# Patient Record
Sex: MALE | Race: WHITE | NOT HISPANIC OR LATINO
[De-identification: names, ages, dates, MRNs, and addresses within clinical notes are randomized per-mention and may not be internally consistent; named-entity substitution may affect disease eponyms.]

---

## 2017-03-20 ENCOUNTER — APPOINTMENT (OUTPATIENT)
Dept: DERMATOLOGY | Facility: CLINIC | Age: 67
End: 2017-03-20

## 2017-04-25 ENCOUNTER — APPOINTMENT (OUTPATIENT)
Dept: DERMATOLOGY | Facility: CLINIC | Age: 67
End: 2017-04-25

## 2017-10-26 ENCOUNTER — APPOINTMENT (OUTPATIENT)
Dept: DERMATOLOGY | Facility: CLINIC | Age: 67
End: 2017-10-26
Payer: MEDICARE

## 2017-10-26 PROCEDURE — 99213 OFFICE O/P EST LOW 20 MIN: CPT

## 2018-05-03 ENCOUNTER — RESULT REVIEW (OUTPATIENT)
Age: 68
End: 2018-05-03

## 2018-05-03 ENCOUNTER — APPOINTMENT (OUTPATIENT)
Dept: DERMATOLOGY | Facility: CLINIC | Age: 68
End: 2018-05-03
Payer: MEDICARE

## 2018-05-03 PROCEDURE — 99214 OFFICE O/P EST MOD 30 MIN: CPT | Mod: 25

## 2018-05-03 PROCEDURE — 11100 BX SKIN SUBCUTANEOUS&/MUCOUS MEMBRANE 1 LESION: CPT

## 2019-05-07 ENCOUNTER — APPOINTMENT (OUTPATIENT)
Dept: DERMATOLOGY | Facility: CLINIC | Age: 69
End: 2019-05-07
Payer: MEDICARE

## 2019-05-07 PROCEDURE — 99213 OFFICE O/P EST LOW 20 MIN: CPT

## 2019-11-11 ENCOUNTER — APPOINTMENT (OUTPATIENT)
Dept: DERMATOLOGY | Facility: CLINIC | Age: 69
End: 2019-11-11
Payer: MEDICARE

## 2019-11-11 PROCEDURE — 99214 OFFICE O/P EST MOD 30 MIN: CPT

## 2020-06-09 ENCOUNTER — APPOINTMENT (OUTPATIENT)
Dept: DERMATOLOGY | Facility: CLINIC | Age: 70
End: 2020-06-09
Payer: MEDICARE

## 2020-06-09 PROCEDURE — 99214 OFFICE O/P EST MOD 30 MIN: CPT

## 2020-12-15 ENCOUNTER — APPOINTMENT (OUTPATIENT)
Dept: DERMATOLOGY | Facility: CLINIC | Age: 70
End: 2020-12-15
Payer: MEDICARE

## 2020-12-15 PROCEDURE — 17000 DESTRUCT PREMALG LESION: CPT

## 2020-12-15 PROCEDURE — 99213 OFFICE O/P EST LOW 20 MIN: CPT | Mod: 25

## 2021-11-12 ENCOUNTER — NON-APPOINTMENT (OUTPATIENT)
Age: 71
End: 2021-11-12

## 2022-02-14 ENCOUNTER — APPOINTMENT (OUTPATIENT)
Dept: DERMATOLOGY | Facility: CLINIC | Age: 72
End: 2022-02-14
Payer: MEDICARE

## 2022-02-14 PROCEDURE — 99213 OFFICE O/P EST LOW 20 MIN: CPT

## 2022-04-15 ENCOUNTER — APPOINTMENT (OUTPATIENT)
Dept: DERMATOLOGY | Facility: CLINIC | Age: 72
End: 2022-04-15
Payer: MEDICARE

## 2022-04-15 PROCEDURE — 99213 OFFICE O/P EST LOW 20 MIN: CPT

## 2022-11-07 ENCOUNTER — NON-APPOINTMENT (OUTPATIENT)
Age: 72
End: 2022-11-07

## 2022-11-14 ENCOUNTER — APPOINTMENT (OUTPATIENT)
Dept: GASTROENTEROLOGY | Facility: CLINIC | Age: 72
End: 2022-11-14

## 2022-11-14 ENCOUNTER — NON-APPOINTMENT (OUTPATIENT)
Age: 72
End: 2022-11-14

## 2022-11-14 VITALS
BODY MASS INDEX: 26.66 KG/M2 | SYSTOLIC BLOOD PRESSURE: 130 MMHG | HEIGHT: 69 IN | HEART RATE: 70 BPM | OXYGEN SATURATION: 98 % | DIASTOLIC BLOOD PRESSURE: 70 MMHG | RESPIRATION RATE: 6 BRPM | WEIGHT: 180 LBS

## 2022-11-14 PROCEDURE — 99204 OFFICE O/P NEW MOD 45 MIN: CPT

## 2022-11-14 RX ORDER — POLYETHYLENE GLYCOL 3350 AND ELECTROLYTES WITH LEMON FLAVOR 236; 22.74; 6.74; 5.86; 2.97 G/4L; G/4L; G/4L; G/4L; G/4L
236 POWDER, FOR SOLUTION ORAL
Qty: 2 | Refills: 0 | Status: ACTIVE | COMMUNITY
Start: 2022-11-14 | End: 1900-01-01

## 2022-11-14 NOTE — REVIEW OF SYSTEMS
[Bloating (gassiness)] : bloating [Negative] : Musculoskeletal [Abdominal Pain] : no abdominal pain [Vomiting] : no vomiting [Constipation] : no constipation [Heartburn] : no heartburn [Melena (black stool)] : no melena [Bleeding] : no bleeding

## 2022-11-14 NOTE — ASSESSMENT
[FreeTextEntry1] : 72 year old male with a history of colon polyps and family history of colon cancer in a first degree relative presents for high risk screening. \par \par High Risk Screening\par Colonoscopy \par Bowel prep sent to pharmacy \par Procedure reviewed with patient  \par \par Dyspepsia/Gas without alarm symptoms\par Continue omeprazole prn \par Avoid dietary triggers

## 2022-11-14 NOTE — HISTORY OF PRESENT ILLNESS
[FreeTextEntry1] : 72 year old male with a history of HTN presents for colon cancer screening. He has a history of colon polyps. He has a family history of colon cancer in his sister. He reports gas and intermittent diarrhea. He states that his symptoms improved after switching to lactaid and adding lactase tabs before meals. He denies nausea, vomiting, reflux, abdominal pain or rectal bleeding. \par \par He uses omeprazole 20mg PRN for gas/dyspepsia stable for many years previously advised to remain on standing PPI, but does not wish to do so. He has never had an EGD  \par His last colon ~4-5 years prio r + polyps benign per patient

## 2022-11-14 NOTE — PHYSICAL EXAM
[Alert] : alert [Normal Voice/Communication] : normal voice/communication [Healthy Appearing] : healthy appearing [No Acute Distress] : no acute distress [Sclera] : the sclera and conjunctiva were normal [Hearing Threshold Finger Rub Not [place]] : hearing was normal [Normal Lips/Gums] : the lips and gums were normal [Oropharynx] : the oropharynx was normal [Normal Appearance] : the appearance of the neck was normal [No Neck Mass] : no neck mass was observed [No Respiratory Distress] : no respiratory distress [No Acc Muscle Use] : no accessory muscle use [Respiration, Rhythm And Depth] : normal respiratory rhythm and effort [Auscultation Breath Sounds / Voice Sounds] : lungs were clear to auscultation bilaterally [Heart Rate And Rhythm] : heart rate was normal and rhythm regular [None] : no edema [Bowel Sounds] : normal bowel sounds [No Masses] : no abdominal mass palpated [Abdomen Tenderness] : non-tender [Abdomen Soft] : soft [Cervical Lymph Nodes Enlarged Posterior Bilaterally] : no posterior cervical lymphadenopathy [Supraclavicular Lymph Nodes Enlarged Bilaterally] : no supraclavicular lymphadenopathy [Cervical Lymph Nodes Enlarged Anterior Bilaterally] : no anterior cervical lymphadenopathy [No CVA Tenderness] : no CVA  tenderness [No Spinal Tenderness] : no spinal tenderness [Normal Color / Pigmentation] : normal skin color and pigmentation [No Focal Deficits] : no focal deficits [Oriented To Time, Place, And Person] : oriented to person, place, and time

## 2022-11-27 ENCOUNTER — TRANSCRIPTION ENCOUNTER (OUTPATIENT)
Age: 72
End: 2022-11-27

## 2022-11-27 LAB — SARS-COV-2 N GENE NPH QL NAA+PROBE: NOT DETECTED

## 2022-11-28 ENCOUNTER — RESULT REVIEW (OUTPATIENT)
Age: 72
End: 2022-11-28

## 2022-11-28 ENCOUNTER — OUTPATIENT (OUTPATIENT)
Dept: OUTPATIENT SERVICES | Facility: HOSPITAL | Age: 72
LOS: 1 days | Discharge: HOME | End: 2022-11-28
Payer: MEDICARE

## 2022-11-28 ENCOUNTER — APPOINTMENT (OUTPATIENT)
Dept: GASTROENTEROLOGY | Facility: GI CENTER | Age: 72
End: 2022-11-28

## 2022-11-28 DIAGNOSIS — K63.5 POLYP OF COLON: ICD-10-CM

## 2022-11-28 DIAGNOSIS — Z12.11 ENCOUNTER FOR SCREENING FOR MALIGNANT NEOPLASM OF COLON: ICD-10-CM

## 2022-11-28 PROCEDURE — 45380 COLONOSCOPY AND BIOPSY: CPT | Mod: PT

## 2022-11-28 PROCEDURE — 88305 TISSUE EXAM BY PATHOLOGIST: CPT | Mod: 26

## 2022-11-28 PROCEDURE — 88305 TISSUE EXAM BY PATHOLOGIST: CPT

## 2022-11-28 NOTE — PHYSICAL EXAM

## 2022-12-02 LAB — SURGICAL PATHOLOGY STUDY: SIGNIFICANT CHANGE UP

## 2023-02-13 ENCOUNTER — OFFICE (OUTPATIENT)
Dept: URBAN - METROPOLITAN AREA CLINIC 12 | Facility: CLINIC | Age: 73
Setting detail: OPHTHALMOLOGY
End: 2023-02-13
Payer: MEDICARE

## 2023-02-13 DIAGNOSIS — H43.813: ICD-10-CM

## 2023-02-13 DIAGNOSIS — H43.393: ICD-10-CM

## 2023-02-13 DIAGNOSIS — H16.223: ICD-10-CM

## 2023-02-13 DIAGNOSIS — H25.13: ICD-10-CM

## 2023-02-13 DIAGNOSIS — H53.2: ICD-10-CM

## 2023-02-13 DIAGNOSIS — H40.013: ICD-10-CM

## 2023-02-13 PROCEDURE — 92250 FUNDUS PHOTOGRAPHY W/I&R: CPT | Performed by: OPHTHALMOLOGY

## 2023-02-13 PROCEDURE — 92014 COMPRE OPH EXAM EST PT 1/>: CPT | Performed by: OPHTHALMOLOGY

## 2023-02-13 ASSESSMENT — SUPERFICIAL PUNCTATE KERATITIS (SPK)
OD_SPK: T
OS_SPK: T

## 2023-02-13 ASSESSMENT — KERATOMETRY
METHOD_AUTO_MANUAL: AUTO
OD_K1POWER_DIOPTERS: 41.75
OS_K2POWER_DIOPTERS: 42.25
OS_K1POWER_DIOPTERS: 41.50
OD_K2POWER_DIOPTERS: 42.25
OS_AXISANGLE_DEGREES: 79
OD_AXISANGLE_DEGREES: 81

## 2023-02-13 ASSESSMENT — TONOMETRY
OD_IOP_MMHG: 16
OS_IOP_MMHG: 21

## 2023-02-13 ASSESSMENT — REFRACTION_CURRENTRX
OS_VPRISM_DIRECTION: SV
OS_AXIS: 131
OS_CYLINDER: -0.50
OD_VPRISM_DIRECTION: SV
OD_VPRISM_DIRECTION: SV
OS_OVR_VA: 20/
OD_SPHERE: +1.50
OD_OVR_VA: 20/
OS_VPRISM_DIRECTION: SV
OS_SPHERE: -1.00
OD_CYLINDER: -1.25
OS_OVR_VA: 20/
OS_SPHERE: +1.50
OD_SPHERE: -1.50
OD_OVR_VA: 20/
OD_AXIS: 131

## 2023-02-13 ASSESSMENT — CONFRONTATIONAL VISUAL FIELD TEST (CVF)
OS_FINDINGS: FULL
OD_FINDINGS: FULL

## 2023-02-13 ASSESSMENT — REFRACTION_MANIFEST
OD_CYLINDER: -0.75
OU_VA: 20/25
OS_CYLINDER: -0.50
OS_HPRISM: 1
OD_ADD: +2.50
OD_VPRISM_DIRECTION: BO
OS_SPHERE: -1.25
OD_AXIS: 151
OD_HPRISM: 1
OS_AXIS: 156
OS_VPRISM_DIRECTION: BO
OD_SPHERE: -1.00
OS_ADD: +2.50

## 2023-02-13 ASSESSMENT — AXIALLENGTH_DERIVED
OS_AL: 24.8355
OD_AL: 24.731
OS_AL: 24.8355
OD_AL: 24.731

## 2023-02-13 ASSESSMENT — SPHEQUIV_DERIVED
OS_SPHEQUIV: -1.5
OD_SPHEQUIV: -1.375
OS_SPHEQUIV: -1.5
OD_SPHEQUIV: -1.375

## 2023-02-13 ASSESSMENT — REFRACTION_AUTOREFRACTION
OD_SPHERE: -1.00
OS_AXIS: 156
OS_CYLINDER: -0.50
OD_AXIS: 151
OD_CYLINDER: -0.75
OS_SPHERE: -1.25

## 2023-02-13 ASSESSMENT — PACHYMETRY
OD_CT_UM: 499
OS_CT_UM: 496
OD_CT_CORRECTION: 4
OS_CT_CORRECTION: 4

## 2023-02-13 ASSESSMENT — VISUAL ACUITY
OS_BCVA: 20/20-1
OD_BCVA: 20/20

## 2023-03-24 PROBLEM — H40.1131 POAG; BOTH EYES MILD: Status: ACTIVE | Noted: 2023-03-24

## 2023-03-27 PROBLEM — H11.153 PINGUECULA; BOTH EYES: Status: ACTIVE | Noted: 2023-03-27

## 2023-04-13 ENCOUNTER — APPOINTMENT (OUTPATIENT)
Dept: DERMATOLOGY | Facility: CLINIC | Age: 73
End: 2023-04-13
Payer: MEDICARE

## 2023-04-13 PROCEDURE — 99213 OFFICE O/P EST LOW 20 MIN: CPT | Mod: 25

## 2023-04-13 PROCEDURE — 17000 DESTRUCT PREMALG LESION: CPT

## 2023-10-17 ENCOUNTER — APPOINTMENT (OUTPATIENT)
Dept: DERMATOLOGY | Facility: CLINIC | Age: 73
End: 2023-10-17
Payer: MEDICARE

## 2023-10-17 PROCEDURE — 99213 OFFICE O/P EST LOW 20 MIN: CPT | Mod: 25

## 2023-10-17 PROCEDURE — 17003 DESTRUCT PREMALG LES 2-14: CPT

## 2023-10-17 PROCEDURE — 17000 DESTRUCT PREMALG LESION: CPT

## 2023-10-30 ENCOUNTER — OFFICE (OUTPATIENT)
Dept: URBAN - METROPOLITAN AREA CLINIC 12 | Facility: CLINIC | Age: 73
Setting detail: OPHTHALMOLOGY
End: 2023-10-30
Payer: MEDICARE

## 2023-10-30 DIAGNOSIS — H25.13: ICD-10-CM

## 2023-10-30 DIAGNOSIS — H43.393: ICD-10-CM

## 2023-10-30 DIAGNOSIS — H40.013: ICD-10-CM

## 2023-10-30 DIAGNOSIS — H16.223: ICD-10-CM

## 2023-10-30 DIAGNOSIS — H53.2: ICD-10-CM

## 2023-10-30 DIAGNOSIS — H43.813: ICD-10-CM

## 2023-10-30 PROCEDURE — 92133 CPTRZD OPH DX IMG PST SGM ON: CPT | Performed by: OPHTHALMOLOGY

## 2023-10-30 PROCEDURE — 92014 COMPRE OPH EXAM EST PT 1/>: CPT | Performed by: OPHTHALMOLOGY

## 2023-10-30 PROCEDURE — 92083 EXTENDED VISUAL FIELD XM: CPT | Performed by: OPHTHALMOLOGY

## 2023-10-30 ASSESSMENT — REFRACTION_AUTOREFRACTION
OS_CYLINDER: -0.50
OD_SPHERE: -1.25
OD_CYLINDER: -0.50
OD_AXIS: 136
OS_AXIS: 134
OS_SPHERE: -1.25

## 2023-10-30 ASSESSMENT — KERATOMETRY
OS_AXISANGLE_DEGREES: 072
METHOD_AUTO_MANUAL: AUTO
OD_AXISANGLE_DEGREES: 079
OS_K2POWER_DIOPTERS: 42.25
OD_K1POWER_DIOPTERS: 42.00
OD_K2POWER_DIOPTERS: 42.25
OS_K1POWER_DIOPTERS: 41.75

## 2023-10-30 ASSESSMENT — REFRACTION_MANIFEST
OS_CYLINDER: -0.50
OS_VPRISM_DIRECTION: BO
OS_SPHERE: -1.25
OS_HPRISM: 1
OU_VA: 20/25
OD_HPRISM: 1
OS_AXIS: 134
OD_ADD: +2.50
OD_VPRISM_DIRECTION: BO
OS_VA1: 20/20
OS_ADD: +2.50
OD_CYLINDER: -0.50
OD_SPHERE: -1.25
OD_AXIS: 136

## 2023-10-30 ASSESSMENT — REFRACTION_CURRENTRX
OS_AXIS: 100
OS_VPRISM_DIRECTION: SV
OS_VPRISM_DIRECTION: SV
OS_OVR_VA: 20/
OD_SPHERE: +1.50
OD_VPRISM_DIRECTION: SV
OS_SPHERE: -1.00
OS_CYLINDER: -0.50
OD_AXIS: 119
OD_CYLINDER: -1.50
OD_OVR_VA: 20/
OS_OVR_VA: 20/
OD_OVR_VA: 20/
OD_VPRISM_DIRECTION: SV
OS_SPHERE: +1.50
OD_SPHERE: -1.75

## 2023-10-30 ASSESSMENT — CONFRONTATIONAL VISUAL FIELD TEST (CVF)
OS_FINDINGS: FULL
OD_FINDINGS: FULL

## 2023-10-30 ASSESSMENT — PACHYMETRY
OD_CT_UM: 499
OS_CT_CORRECTION: 4
OD_CT_CORRECTION: 4
OS_CT_UM: 496

## 2023-10-30 ASSESSMENT — AXIALLENGTH_DERIVED
OS_AL: 24.7847
OS_AL: 24.7847
OD_AL: 24.7341
OD_AL: 24.7341

## 2023-10-30 ASSESSMENT — SPHEQUIV_DERIVED
OD_SPHEQUIV: -1.5
OD_SPHEQUIV: -1.5
OS_SPHEQUIV: -1.5
OS_SPHEQUIV: -1.5

## 2023-10-30 ASSESSMENT — SUPERFICIAL PUNCTATE KERATITIS (SPK)
OD_SPK: T
OS_SPK: T

## 2023-10-30 ASSESSMENT — TONOMETRY
OS_IOP_MMHG: 16
OD_IOP_MMHG: 13

## 2023-10-30 ASSESSMENT — VISUAL ACUITY
OS_BCVA: 20/20
OD_BCVA: 20/20

## 2024-02-27 ENCOUNTER — OFFICE (OUTPATIENT)
Dept: URBAN - METROPOLITAN AREA CLINIC 12 | Facility: CLINIC | Age: 74
Setting detail: OPHTHALMOLOGY
End: 2024-02-27
Payer: MEDICARE

## 2024-02-27 DIAGNOSIS — H02.531: ICD-10-CM

## 2024-02-27 DIAGNOSIS — H16.223: ICD-10-CM

## 2024-02-27 DIAGNOSIS — H02.534: ICD-10-CM

## 2024-02-27 PROCEDURE — 99213 OFFICE O/P EST LOW 20 MIN: CPT | Performed by: STUDENT IN AN ORGANIZED HEALTH CARE EDUCATION/TRAINING PROGRAM

## 2024-02-27 ASSESSMENT — REFRACTION_AUTOREFRACTION
OS_SPHERE: -1.00
OS_AXIS: 150
OD_CYLINDER: -1.00
OS_CYLINDER: -0.50
OD_SPHERE: -1.00
OD_AXIS: 148

## 2024-02-27 ASSESSMENT — REFRACTION_CURRENTRX
OD_OVR_VA: 20/
OS_CYLINDER: -0.50
OS_VPRISM_DIRECTION: SV
OS_SPHERE: +1.50
OS_OVR_VA: 20/
OS_OVR_VA: 20/
OD_OVR_VA: 20/
OD_VPRISM_DIRECTION: SV
OS_AXIS: 100
OD_VPRISM_DIRECTION: SV
OD_CYLINDER: -1.50
OD_SPHERE: -1.75
OD_SPHERE: +1.50
OS_VPRISM_DIRECTION: SV
OD_AXIS: 119
OS_SPHERE: -1.00

## 2024-02-27 ASSESSMENT — SPHEQUIV_DERIVED
OD_SPHEQUIV: -1.5
OD_SPHEQUIV: -1.5
OS_SPHEQUIV: -1.25
OS_SPHEQUIV: -1.5

## 2024-02-27 ASSESSMENT — REFRACTION_MANIFEST
OD_ADD: +2.50
OS_SPHERE: -1.25
OS_ADD: +2.50
OS_CYLINDER: -0.50
OD_SPHERE: -1.25
OU_VA: 20/25
OD_VPRISM_DIRECTION: BO
OD_CYLINDER: -0.50
OD_AXIS: 136
OD_HPRISM: 1
OS_VA1: 20/20
OS_VPRISM_DIRECTION: BO
OS_AXIS: 134
OS_HPRISM: 1

## 2024-02-27 ASSESSMENT — CONFRONTATIONAL VISUAL FIELD TEST (CVF)
OS_FINDINGS: FULL
OD_FINDINGS: FULL

## 2024-02-27 ASSESSMENT — SUPERFICIAL PUNCTATE KERATITIS (SPK)
OS_SPK: T
OD_SPK: T

## 2024-03-15 ENCOUNTER — OFFICE (OUTPATIENT)
Dept: URBAN - METROPOLITAN AREA CLINIC 12 | Facility: CLINIC | Age: 74
Setting detail: OPHTHALMOLOGY
End: 2024-03-15
Payer: MEDICARE

## 2024-03-15 DIAGNOSIS — H25.13: ICD-10-CM

## 2024-03-15 DIAGNOSIS — H25.89: ICD-10-CM

## 2024-03-15 DIAGNOSIS — H02.531: ICD-10-CM

## 2024-03-15 DIAGNOSIS — H02.534: ICD-10-CM

## 2024-03-15 DIAGNOSIS — H16.223: ICD-10-CM

## 2024-03-15 PROCEDURE — 99213 OFFICE O/P EST LOW 20 MIN: CPT | Performed by: OPTOMETRIST

## 2024-03-15 ASSESSMENT — REFRACTION_CURRENTRX
OD_AXIS: 119
OS_VPRISM_DIRECTION: SV
OD_VPRISM_DIRECTION: SV
OS_OVR_VA: 20/
OS_CYLINDER: -0.50
OS_OVR_VA: 20/
OS_SPHERE: -1.00
OD_CYLINDER: -1.50
OS_SPHERE: +1.50
OD_SPHERE: -1.75
OD_SPHERE: +1.50
OS_VPRISM_DIRECTION: SV
OD_OVR_VA: 20/
OD_OVR_VA: 20/
OS_AXIS: 100
OD_VPRISM_DIRECTION: SV

## 2024-03-15 ASSESSMENT — REFRACTION_MANIFEST
OD_ADD: +2.50
OU_VA: 20/25
OD_CYLINDER: -0.50
OD_VPRISM_DIRECTION: BO
OS_VA1: 20/20
OS_ADD: +2.50
OD_AXIS: 136
OS_HPRISM: 1
OS_VPRISM_DIRECTION: BO
OD_HPRISM: 1
OS_AXIS: 134
OS_SPHERE: -1.25
OD_SPHERE: -1.25
OS_CYLINDER: -0.50

## 2024-03-15 ASSESSMENT — SPHEQUIV_DERIVED
OS_SPHEQUIV: -1.5
OD_SPHEQUIV: -1.5

## 2024-05-23 PROBLEM — H11.042 PTERYGIUM-PERIPHERAL ; LEFT EYE: Status: ACTIVE | Noted: 2024-05-23

## 2024-08-07 ENCOUNTER — NON-APPOINTMENT (OUTPATIENT)
Age: 74
End: 2024-08-07

## 2024-08-12 ENCOUNTER — APPOINTMENT (OUTPATIENT)
Dept: GASTROENTEROLOGY | Facility: CLINIC | Age: 74
End: 2024-08-12

## 2024-10-17 ENCOUNTER — APPOINTMENT (OUTPATIENT)
Dept: DERMATOLOGY | Facility: CLINIC | Age: 74
End: 2024-10-17
Payer: MEDICARE

## 2024-10-17 PROCEDURE — 99213 OFFICE O/P EST LOW 20 MIN: CPT

## 2024-10-21 ENCOUNTER — RX ONLY (RX ONLY)
Age: 74
End: 2024-10-21

## 2024-10-21 ENCOUNTER — OFFICE (OUTPATIENT)
Dept: URBAN - METROPOLITAN AREA CLINIC 12 | Facility: CLINIC | Age: 74
Setting detail: OPHTHALMOLOGY
End: 2024-10-21
Payer: MEDICARE

## 2024-10-21 DIAGNOSIS — H43.393: ICD-10-CM

## 2024-10-21 DIAGNOSIS — H16.223: ICD-10-CM

## 2024-10-21 DIAGNOSIS — H02.531: ICD-10-CM

## 2024-10-21 DIAGNOSIS — H53.2: ICD-10-CM

## 2024-10-21 DIAGNOSIS — H25.89: ICD-10-CM

## 2024-10-21 DIAGNOSIS — H40.013: ICD-10-CM

## 2024-10-21 DIAGNOSIS — H02.534: ICD-10-CM

## 2024-10-21 DIAGNOSIS — H25.13: ICD-10-CM

## 2024-10-21 DIAGNOSIS — H43.813: ICD-10-CM

## 2024-10-21 PROCEDURE — 92014 COMPRE OPH EXAM EST PT 1/>: CPT | Performed by: OPHTHALMOLOGY

## 2024-10-21 PROCEDURE — 92250 FUNDUS PHOTOGRAPHY W/I&R: CPT | Performed by: OPHTHALMOLOGY

## 2024-10-21 ASSESSMENT — REFRACTION_MANIFEST
OD_CYLINDER: -0.50
OD_SPHERE: -1.25
OU_VA: 20/25
OS_ADD: +2.50
OD_SPHERE: -0.75
OS_HPRISM: 1
OD_HPRISM: 1
OS_CYLINDER: -0.25
OD_ADD: +2.50
OD_AXIS: 155
OS_VA1: 20/20
OD_CYLINDER: -0.50
OS_CYLINDER: -0.50
OS_VA1: 20/20-2
OD_AXIS: 136
OS_AXIS: 145
OS_SPHERE: -1.00
OD_VPRISM_DIRECTION: BO
OS_AXIS: 134
OD_VA1: 20/30-2
OS_VPRISM_DIRECTION: BO
OS_SPHERE: -1.25

## 2024-10-21 ASSESSMENT — REFRACTION_CURRENTRX
OS_SPHERE: +1.50
OS_VPRISM_DIRECTION: SV
OD_OVR_VA: 20/
OD_OVR_VA: 20/
OD_VPRISM_DIRECTION: SV
OD_SPHERE: +1.50
OD_VPRISM_DIRECTION: SV
OS_SPHERE: -1.00
OD_AXIS: 119
OS_OVR_VA: 20/
OS_CYLINDER: -0.50
OS_OVR_VA: 20/
OD_SPHERE: -1.75
OD_CYLINDER: -1.50
OS_AXIS: 100
OS_VPRISM_DIRECTION: SV

## 2024-10-21 ASSESSMENT — TONOMETRY
OD_IOP_MMHG: 12
OS_IOP_MMHG: 16

## 2024-10-21 ASSESSMENT — KERATOMETRY
OS_K2POWER_DIOPTERS: 42.25
OD_K2POWER_DIOPTERS: 42.25
OS_K1POWER_DIOPTERS: 41.75
OD_K1POWER_DIOPTERS: 41.75
OD_AXISANGLE_DEGREES: 089
OS_AXISANGLE_DEGREES: 075
METHOD_AUTO_MANUAL: AUTO

## 2024-10-21 ASSESSMENT — CONFRONTATIONAL VISUAL FIELD TEST (CVF)
OS_FINDINGS: FULL
OD_FINDINGS: FULL

## 2024-10-21 ASSESSMENT — VISUAL ACUITY
OS_BCVA: 20/20-2
OD_BCVA: 20/20-1

## 2024-10-21 ASSESSMENT — PACHYMETRY
OD_CT_UM: 499
OD_CT_CORRECTION: 4
OS_CT_UM: 496
OS_CT_CORRECTION: 4

## 2024-10-21 ASSESSMENT — SUPERFICIAL PUNCTATE KERATITIS (SPK)
OS_SPK: T
OD_SPK: T

## 2024-10-21 ASSESSMENT — REFRACTION_AUTOREFRACTION
OD_AXIS: 156
OS_SPHERE: -1.00
OD_CYLINDER: -0.50
OD_SPHERE: -0.75
OS_AXIS: 145
OS_CYLINDER: -0.25

## 2025-02-12 PROBLEM — H01.005 BLEPHARITIS; LEFT LOWER LID , RIGHT LOWER LID, LEFT UPPER LID, RIGHT UPPER LID: Status: ACTIVE | Noted: 2025-02-12

## 2025-02-12 PROBLEM — H01.001 BLEPHARITIS; LEFT LOWER LID , RIGHT LOWER LID, LEFT UPPER LID, RIGHT UPPER LID: Status: ACTIVE | Noted: 2025-02-12

## 2025-02-12 PROBLEM — H01.004 BLEPHARITIS; LEFT LOWER LID , RIGHT LOWER LID, LEFT UPPER LID, RIGHT UPPER LID: Status: ACTIVE | Noted: 2025-02-12

## 2025-02-12 PROBLEM — H01.002 BLEPHARITIS; LEFT LOWER LID , RIGHT LOWER LID, LEFT UPPER LID, RIGHT UPPER LID: Status: ACTIVE | Noted: 2025-02-12

## 2025-03-10 PROBLEM — H11.153 PINGUECULA; BOTH EYES: Status: ACTIVE | Noted: 2025-03-10

## 2025-06-30 PROBLEM — H35.373 EPIRETINAL MEMBRANE ; BOTH EYES: Status: ACTIVE | Noted: 2025-06-30

## 2025-06-30 PROBLEM — H25.13 CATARACT ; BOTH EYES: Status: ACTIVE | Noted: 2025-06-30

## 2025-07-31 ENCOUNTER — OFFICE (OUTPATIENT)
Dept: URBAN - METROPOLITAN AREA CLINIC 12 | Facility: CLINIC | Age: 75
Setting detail: OPHTHALMOLOGY
End: 2025-07-31
Payer: MEDICARE

## 2025-07-31 DIAGNOSIS — H40.013: ICD-10-CM

## 2025-07-31 DIAGNOSIS — H02.531: ICD-10-CM

## 2025-07-31 DIAGNOSIS — H43.393: ICD-10-CM

## 2025-07-31 DIAGNOSIS — H25.89: ICD-10-CM

## 2025-07-31 DIAGNOSIS — H16.223: ICD-10-CM

## 2025-07-31 DIAGNOSIS — H02.534: ICD-10-CM

## 2025-07-31 DIAGNOSIS — H53.2: ICD-10-CM

## 2025-07-31 DIAGNOSIS — H25.13: ICD-10-CM

## 2025-07-31 DIAGNOSIS — H43.813: ICD-10-CM

## 2025-07-31 PROCEDURE — 92014 COMPRE OPH EXAM EST PT 1/>: CPT | Performed by: OPHTHALMOLOGY

## 2025-07-31 PROCEDURE — 92083 EXTENDED VISUAL FIELD XM: CPT | Performed by: OPHTHALMOLOGY

## 2025-07-31 PROCEDURE — 92133 CPTRZD OPH DX IMG PST SGM ON: CPT | Performed by: OPHTHALMOLOGY

## 2025-07-31 ASSESSMENT — REFRACTION_MANIFEST
OS_SPHERE: -1.25
OD_SPHERE: -0.75
OD_VPRISM_DIRECTION: BO
OD_CYLINDER: -0.50
OD_AXIS: 136
OS_VA1: 20/25-2
OD_HPRISM: 1
OD_AXIS: 165
OS_HPRISM: 1
OS_VPRISM_DIRECTION: BO
OD_SPHERE: -1.25
OU_VA: 20/25
OS_SPHERE: -0.75
OS_VA1: 20/20
OD_CYLINDER: -0.50
OD_ADD: +2.50
OS_AXIS: 134
OS_AXIS: 159
OS_CYLINDER: -0.50
OS_ADD: +2.50
OS_CYLINDER: -0.50

## 2025-07-31 ASSESSMENT — PACHYMETRY
OS_CT_CORRECTION: 4
OD_CT_CORRECTION: 4
OS_CT_UM: 496
OD_CT_UM: 499

## 2025-07-31 ASSESSMENT — REFRACTION_CURRENTRX
OD_VPRISM_DIRECTION: SV
OS_OVR_VA: 20/
OS_AXIS: 100
OS_VPRISM_DIRECTION: SV
OD_SPHERE: -1.75
OS_SPHERE: -1.00
OD_OVR_VA: 20/
OD_CYLINDER: -1.50
OS_CYLINDER: -0.50
OS_SPHERE: +1.50
OD_SPHERE: +1.50
OD_OVR_VA: 20/
OD_AXIS: 119
OS_VPRISM_DIRECTION: SV
OD_VPRISM_DIRECTION: SV
OS_OVR_VA: 20/

## 2025-07-31 ASSESSMENT — CONFRONTATIONAL VISUAL FIELD TEST (CVF)
OS_FINDINGS: FULL
OD_FINDINGS: FULL

## 2025-07-31 ASSESSMENT — VISUAL ACUITY
OS_BCVA: 20/30
OD_BCVA: 20/30-2

## 2025-07-31 ASSESSMENT — TONOMETRY
OS_IOP_MMHG: 16
OD_IOP_MMHG: 12

## (undated) DEVICE — STERIS DEFENDO 3-PIECE KIT (AIR/WATER, SUCTION & BIOPSY VALVES)

## (undated) DEVICE — FORCEP RADIAL JAW 4 240CM DISP